# Patient Record
Sex: FEMALE | Race: WHITE | NOT HISPANIC OR LATINO | Employment: FULL TIME | URBAN - METROPOLITAN AREA
[De-identification: names, ages, dates, MRNs, and addresses within clinical notes are randomized per-mention and may not be internally consistent; named-entity substitution may affect disease eponyms.]

---

## 2019-05-21 ENCOUNTER — OFFICE VISIT (OUTPATIENT)
Dept: PODIATRY | Facility: CLINIC | Age: 29
End: 2019-05-21
Payer: COMMERCIAL

## 2019-05-21 VITALS
RESPIRATION RATE: 18 BRPM | TEMPERATURE: 97 F | BODY MASS INDEX: 36.1 KG/M2 | OXYGEN SATURATION: 99 % | DIASTOLIC BLOOD PRESSURE: 95 MMHG | WEIGHT: 230 LBS | HEART RATE: 72 BPM | HEIGHT: 67 IN | SYSTOLIC BLOOD PRESSURE: 140 MMHG

## 2019-05-21 DIAGNOSIS — L60.0 INGROWN NAIL OF GREAT TOE OF RIGHT FOOT: Primary | ICD-10-CM

## 2019-05-21 PROCEDURE — 99999 PR PBB SHADOW E&M-NEW PATIENT-LVL III: CPT | Mod: PBBFAC,,, | Performed by: PODIATRIST

## 2019-05-21 PROCEDURE — 3008F BODY MASS INDEX DOCD: CPT | Mod: S$GLB,,, | Performed by: PODIATRIST

## 2019-05-21 PROCEDURE — 99202 OFFICE O/P NEW SF 15 MIN: CPT | Mod: 25,S$GLB,, | Performed by: PODIATRIST

## 2019-05-21 PROCEDURE — 3008F PR BODY MASS INDEX (BMI) DOCUMENTED: ICD-10-PCS | Mod: S$GLB,,, | Performed by: PODIATRIST

## 2019-05-21 PROCEDURE — 11730 AVULSION NAIL PLATE SIMPLE 1: CPT | Mod: T5,S$GLB,, | Performed by: PODIATRIST

## 2019-05-21 PROCEDURE — 99202 PR OFFICE/OUTPT VISIT, NEW, LEVL II, 15-29 MIN: ICD-10-PCS | Mod: 25,S$GLB,, | Performed by: PODIATRIST

## 2019-05-21 PROCEDURE — 99999 PR PBB SHADOW E&M-NEW PATIENT-LVL III: ICD-10-PCS | Mod: PBBFAC,,, | Performed by: PODIATRIST

## 2019-05-21 PROCEDURE — 11730 NAIL REMOVAL: ICD-10-PCS | Mod: T5,S$GLB,, | Performed by: PODIATRIST

## 2019-05-21 RX ORDER — MONTELUKAST SODIUM 10 MG/1
TABLET ORAL
Refills: 0 | COMMUNITY
Start: 2019-05-07

## 2019-05-22 NOTE — PROCEDURES
Nail Removal  Date/Time: 5/21/2019 12:05 PM  Performed by: Jacki Bailey DPM  Authorized by: Jacki Bailey DPM     Consent Done?:  Yes (Written)    Location:  Right foot  Location detail:  Right big toe  Anesthesia:  Digital block (Ethyl chloride spray)  Local anesthetic: lidocaine 1% without epinephrine and bupivacaine 0.5% without epinephrine  Anesthetic total (ml):  2.5 (each)  Preparation:  Skin prepped with alcohol    Amount removed:  Partial  Nail removed location: medial. No pus.  Wedge excision of skin of nail fold: No    Nail bed sutured?: No    Nail matrix removed:  None  Dressing applied:  Antibiotic ointment (telfa, gauze, coban )  Patient tolerance:  Patient tolerated the procedure well with no immediate complications       Verbal and written instructions on care of area given

## 2019-05-22 NOTE — PROGRESS NOTES
Subjective:      Patient ID: Elena Luna is a 29 y.o. female.    Chief Complaint: Ingrown Toenail (RT great toenail)  Patient presents with her  today with complaint of painful right great toe.  States she has been treating this for about 1 year, trimming it on a regular basis.  She relates pus occurs in the corner often, she will trim it, cleans it, and has relief for a short period of time.  She has been soaking it in Epson salt, pain is increasing and had pus draining from the area last night.  She reports being on oral antibiotics on and off over the last several months for chronic sinus/ allergy issues.  Finished an antibiotic just 4 weeks ago.   Reports being on antibiotics has not really ever helped her ingrown nail. Pain level 2/10    ROS     Constitutional    Pleasant, well-nourished, no distress   Patient reports she is in good health, takes singular for allergies only    Cardiovascular          No chest pain, no shortness of breath    Respiratory          No cough, no congestion     Musculoskeletal        No muscle aches, no arthralgias/joint pain, no back pain, no swelling in the extremities    Endocrine        No diabetes        Objective:      Physical Exam  Vascular         Arterial Pulses Right: posterior tibialis 2/4, dorsalis pedis 2/4, normal CFT   Arterial Pulses Left: posterior tibialis 2/4, dorsalis pedis 2/4, normal CFT   No lower extremity edema bilateral   Pedal skin temperature and color are normal bilateral     Integumentary   Due to shape of the nail in the medial aspect of the right hallux nail plate it is obvious this condition has been present for a while.  The nail is ingrown, very tender upon palpation, trimmed very short chronically.  There is mild erythema, no calor or active drainage this morning.  The distal medial aspect /tuft of hallux is tense, firm very sore. There is a small area brown discoloration at the distal medial aspect, no active drainage from this area           Neurological   Gross sensation intact    Musculoskeletal   Muscle Strength/Testing and Tone:  Intact, normal tone bilateral   Joints, Bones, and Muscles: Normal with normal ROM                        Assessment:       Encounter Diagnosis   Name Primary?    Ingrown nail of great toe of right foot Yes         Plan:       Elena was seen today for ingrown toenail.    Diagnoses and all orders for this visit:    Ingrown nail of great toe of right foot      Reviewed potential complications of chronic ingrown nail, concern for infection.  We discussed better maintenance of the nail and trying to prevent recurrence.  We discussed conservative versus nail avulsion procedure.  Advised patient she has pretty much exhausted all conservative treatments which would include cleaning with peroxide, antibiotic ointment, soaking in Epsom salt an oral antibiotic.  Explained if all of these treatments are done together did there is a possibility nail may be able to grow out.  We discussed nail avulsion procedure, injections to anesthetize the toe, removing the nail to allow infection to resolve before progresses any further and possibly better nail growing in, however there is still the possibility of recurrence due specially due to chronicity.  Patient was in understanding and agreement with treatment plan.  She also does feel she has tried all conservative treatments and would like to pursue nail avulsion.  See procedure report attached  Reassured patient no active purulent drainage, no oral antibiotic needed at this time.  However if she experiences increased pain, swelling or notices drainage contact the office immediately for oral antibiotic to be sent in to her pharmacy.   Patient verbalized understanding.  We discussed and dispensed written instructions for care of area following procedure today.  I counseled the patient on her conditions, their implications and medical management.  Instructed patient to contact the  office with any changes, questions, concerns, worsening of symptoms if not significantly improved in 3-4 days are not resolved in 7-10 days. Patient/family verbalized understanding.   Total face to face time, exam, assessment, treatment, discussion, documentation 20 minutes, more than half this time spent on consultation and coordination of care.   Additional time required for procedure/ nail avulsion medial right hallux.  Follow up as needed      This note was created using M*Modal voice recognition software that occasionally misinterpreted phrases or words.